# Patient Record
Sex: FEMALE | Race: WHITE | NOT HISPANIC OR LATINO
[De-identification: names, ages, dates, MRNs, and addresses within clinical notes are randomized per-mention and may not be internally consistent; named-entity substitution may affect disease eponyms.]

---

## 2019-06-11 ENCOUNTER — APPOINTMENT (OUTPATIENT)
Dept: ENDOCRINOLOGY | Facility: CLINIC | Age: 59
End: 2019-06-11

## 2020-01-17 ENCOUNTER — APPOINTMENT (OUTPATIENT)
Dept: NEUROLOGY | Facility: CLINIC | Age: 60
End: 2020-01-17
Payer: COMMERCIAL

## 2020-01-17 VITALS
HEIGHT: 62 IN | DIASTOLIC BLOOD PRESSURE: 77 MMHG | WEIGHT: 135 LBS | SYSTOLIC BLOOD PRESSURE: 133 MMHG | HEART RATE: 72 BPM | BODY MASS INDEX: 24.84 KG/M2
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
## 2020-01-17 DIAGNOSIS — G56.03 CARPAL TUNNEL SYNDROM,BILATERAL UPPER LIMBS: ICD-10-CM

## 2020-01-17 DIAGNOSIS — H02.401 UNSPECIFIED PTOSIS OF RIGHT EYELID: ICD-10-CM

## 2020-01-17 DIAGNOSIS — Z87.891 PERSONAL HISTORY OF NICOTINE DEPENDENCE: ICD-10-CM

## 2020-01-17 DIAGNOSIS — M54.2 CERVICALGIA: ICD-10-CM

## 2020-01-17 DIAGNOSIS — R19.8 OTHER SPECIFIED SYMPTOMS AND SIGNS INVOLVING THE DIGESTIVE SYSTEM AND ABDOMEN: ICD-10-CM

## 2020-01-17 DIAGNOSIS — G89.29 LOW BACK PAIN: ICD-10-CM

## 2020-01-17 DIAGNOSIS — R41.3 OTHER AMNESIA: ICD-10-CM

## 2020-01-17 DIAGNOSIS — Z78.9 OTHER SPECIFIED HEALTH STATUS: ICD-10-CM

## 2020-01-17 DIAGNOSIS — M54.5 LOW BACK PAIN: ICD-10-CM

## 2020-01-17 PROCEDURE — 99244 OFF/OP CNSLTJ NEW/EST MOD 40: CPT

## 2020-01-17 RX ORDER — PANTOPRAZOLE 40 MG/1
TABLET, DELAYED RELEASE ORAL
Refills: 0 | Status: ACTIVE | COMMUNITY

## 2020-01-17 RX ORDER — MEPERIDINE HYDROCHLORIDE 100 MG/1
100 TABLET ORAL
Refills: 0 | Status: ACTIVE | COMMUNITY

## 2020-01-17 RX ORDER — ACARBOSE 100 MG/1
100 TABLET ORAL
Refills: 0 | Status: ACTIVE | COMMUNITY

## 2020-01-17 RX ORDER — LINACLOTIDE 290 UG/1
290 CAPSULE, GELATIN COATED ORAL
Refills: 0 | Status: ACTIVE | COMMUNITY
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
## 2020-01-17 NOTE — REVIEW OF SYSTEMS
[Feeling Poorly] : feeling poorly [Memory Lapses or Loss] : memory loss [Facial Weakness] : facial weakness [Numbness] : numbness [Tingling] : tingling [Dizziness] : dizziness [Migraine Headache] : migraine headaches [As Noted in HPI] : as noted in HPI [Negative] : Heme/Lymph

## 2022-04-14 ENCOUNTER — APPOINTMENT (OUTPATIENT)
Dept: ORTHOPEDIC SURGERY | Facility: CLINIC | Age: 62
End: 2022-04-14
Payer: COMMERCIAL

## 2022-04-14 VITALS — BODY MASS INDEX: 24.84 KG/M2 | HEIGHT: 62 IN | WEIGHT: 135 LBS

## 2022-04-14 PROCEDURE — 99214 OFFICE O/P EST MOD 30 MIN: CPT | Mod: 25

## 2022-04-14 PROCEDURE — 20610 DRAIN/INJ JOINT/BURSA W/O US: CPT | Mod: RT

## 2022-04-14 NOTE — DATA REVIEWED
[FreeTextEntry1] : Right knee MRI - \par 1. Complex medial meniscal tearing and medial compartment arthrosis in addition to patellofemoral\par compartment arthrosis with effusion, synovitis, small popliteal cyst and mild posterior soft tissue swelling.\par 2. Slight chronic MCL sprain.\par 3. No acute fracture or lateral meniscal tear.

## 2022-04-14 NOTE — IMAGING
[de-identified] : Right Knee: Inspection of the knee is as follows: minimal effusion. Palpation of the knee is as follows: medial\par joint line tenderness, medial facet of patella tenderness, lateral facet of patella tenderness, patellar\par compression tenderness and crepitus about the patella. distal thigh anterior ttp Knee Range of Motion is as\par follows: Extension: 0 degrees. Flexion: 120 degrees. Strength examination of the knee is as follows: Quadriceps\par strength is 5/5 Neurological examination of the knee is as follows: light touch is intact throughout. Gait and function is\par as follows: mildly antalgic gait.

## 2022-04-14 NOTE — PROCEDURE
[FreeTextEntry3] : The risks, benefits and alternatives to the injection were discussed with the patient. The decision was made to proceed with the injection to reduce inflammation within the area. Verbal consent was obtained for the procedure. The right knee was cleaned with alcohol and ethyl chloride was sprayed topically. Zilretta was injected at the proper mixture 32 Units in 5cc suspension, Lidocaine: 2cc. The patient tolerated the procedure well and was instructed to ice the affected joint as needed later today. Activities can be performed as tolerated\par \par

## 2022-04-14 NOTE — ASSESSMENT
[FreeTextEntry1] : 04/14/2022 - 61 year F with right knee medial meniscal tearing. Would like zilretta injection today. Had prior gel injections w/o significant relief.

## 2022-04-14 NOTE — HISTORY OF PRESENT ILLNESS
[10] : 10 [Dull/Aching] : dull/aching [Constant] : constant [Work] : work [Injection therapy] : injection therapy [Walking] : walking [de-identified] : Ms. TATA LONGORIA is a 61 year female that comes in today with a chief complaint of right knee pain. Pt of Dr. Ordoñez, last received zilretta injection last year. C/o right knee pain today with ambulation.\par  [] : Post Surgical Visit: no [FreeTextEntry1] : right knee  [FreeTextEntry5] : pt was seeing  Dr. Ordoñez for a series of injections and is here today to start the series again  [de-identified] : Dr. Ordoñez  [de-identified] : xrays/mri [de-identified] : november

## 2022-09-08 ENCOUNTER — APPOINTMENT (OUTPATIENT)
Dept: ORTHOPEDIC SURGERY | Facility: CLINIC | Age: 62
End: 2022-09-08
Payer: COMMERCIAL

## 2022-09-08 DIAGNOSIS — M17.11 UNILATERAL PRIMARY OSTEOARTHRITIS, RIGHT KNEE: ICD-10-CM

## 2022-09-08 PROCEDURE — 99214 OFFICE O/P EST MOD 30 MIN: CPT | Mod: 25

## 2022-09-08 PROCEDURE — 20610 DRAIN/INJ JOINT/BURSA W/O US: CPT | Mod: RT

## 2022-09-08 PROCEDURE — 99213 OFFICE O/P EST LOW 20 MIN: CPT | Mod: 25

## 2022-09-08 NOTE — HISTORY OF PRESENT ILLNESS
[10] : 10 [Constant] : constant [Household chores] : household chores [Leisure] : leisure [Sleep] : sleep [de-identified] : 62 f right knee with known OA. has been treated with zilretta last in april 2022 with good results. notes previously treated with zilretta with good results prior as well. recently diagnosed with PMR by rheumatology\par  [FreeTextEntry1] : Right Knee

## 2022-09-08 NOTE — IMAGING
[de-identified] : \par ------------------------------------------------------------------------------------------------------------------------------------------------------\par \par Bilateral knee exam: \par \par Inspection: \par    min Effusion\par    + valgus Malalignment\par    (-) Swelling\par    (-) Quad atrophy\par    (-) J-sign\par ROM: \par    0 - 125 degrees of flexion.\par Tenderness: \par    +MJLT\par    +LJLT\par    +Medial patellar facet tenderness\par    +Lateral patellar facet tenderness\par    (-) Crepitus\par    (-) Patellar grind tenderness\par    (-) Patellar tendon\par    (-) Quad tendon\par    Other: \par Stability: \par    (-) Lachman\par    (-) Varus/Valgus instability\par    (-) Posterior drawer\par    (-) Patellar translation: wnl\par Additional tests: \par    (-) McMurrays test\par    (-) Patellar apprehension\par    Other: \par Strength: 5/5 Q/H/TA/GS/EHL\par Neuro: In tact to light touch throughout, DTR's wnl\par Vascularity: Extremity warm and well perfused\par Gait: normal\par \par \par \par  [Right] : right knee [All Views] : anteroposterior, lateral, skyline, and anteroposterior standing [Mild tricompartmental OA medial narrowing] : Mild tricompartmental OA medial narrowing

## 2022-11-10 ENCOUNTER — FORM ENCOUNTER (OUTPATIENT)
Age: 62
End: 2022-11-10

## 2022-11-10 ENCOUNTER — APPOINTMENT (OUTPATIENT)
Dept: ORTHOPEDIC SURGERY | Facility: CLINIC | Age: 62
End: 2022-11-10
Payer: COMMERCIAL

## 2022-11-10 DIAGNOSIS — M89.8X2 OTHER SPECIFIED DISORDERS OF BONE, UPPER ARM: ICD-10-CM

## 2022-11-10 PROCEDURE — 73060 X-RAY EXAM OF HUMERUS: CPT | Mod: LT

## 2022-11-10 PROCEDURE — 73010 X-RAY EXAM OF SHOULDER BLADE: CPT | Mod: LT

## 2022-11-10 PROCEDURE — 99213 OFFICE O/P EST LOW 20 MIN: CPT

## 2022-11-10 PROCEDURE — 99214 OFFICE O/P EST MOD 30 MIN: CPT

## 2022-11-10 PROCEDURE — 73030 X-RAY EXAM OF SHOULDER: CPT | Mod: LT

## 2022-11-10 NOTE — HISTORY OF PRESENT ILLNESS
[de-identified] : 62 f with left shouder pain. notes that she was seeing a rheumatologist for PMR and having cortisone injections which were helping. howver she ultimately developed a reaction to the injections and after they stopped she has signifiant left lateral shoulder pain.  pain present for months. +NT into hands\par

## 2022-11-10 NOTE — DISCUSSION/SUMMARY
[de-identified] : arthritis does not explain extent of her pain. her pain is out of proportion and recommend mri left shoulder to eval rot cuff tearing, possible AVN, and mri left humerus to eval cortical irregularity. \par fu p mri\par \par ------------------------------------------------------------------------------------------------------------------------------------------------------\par \par The patient was advised of the diagnosis.  The natural history of the pathology was explained in full. All questions were answered.  The risks and benefits of conservative and interventional treatment alternatives were explained to the patient\par \par ------------------------------------------------------------------------------------------------------------------------------------------------------\par \par The patient was advised that an advanced diagnostic/imaging study (MRI/CT/etc) will be ordered to evaluate potential pathology in the affected area(s).  The patient was further advised to follow up in the office to review the results of the study and determine further management that may be indicated.\par \par

## 2022-11-10 NOTE — IMAGING
[de-identified] : \par ------------------------------------------------------------------------------------------------------------------------------------------------------\par \par Left shoulder exam: \par \par limited exam due to pain and guarding\par \par Inspection: no obvious deformity, no obvious masses, no swelling, no effusion, no atrophy\par ROM: \par    FF: 40 active and passive 120 guarding\par    ER: 10\par Tenderness:\par    (-) Anterior/Biceps: \par    (-) Posterior\par    +Lateral\par    (-) Trapezius\par    (-) Scapula\par    (-) AC joint\par    (-) Crepitus with ROM\par Strength: \par    FF: limited exam\par    ER: 4+/5\par    IR: 4+/5\par    Biceps: 5/5\par    Triceps: 5/5\par    Distal: 5/5\par Neuro: In tact to light touch throughout\par Vascularity: Extremity warm and well perfused\par \par  [Left] : left shoulder [Glenohumeral arthritis] : Glenohumeral arthritis [There are no fractures, subluxations or dislocations. No significant abnormalities are seen] : There are no fractures, subluxations or dislocations. No significant abnormalities are seen [Type 2 acromion] : Type 2 acromion [FreeTextEntry1] : mild inferior humeral osteophyte. GT remodeling [FreeTextEntry4] : cortical irregularity at medial humeral shaft.

## 2022-11-11 ENCOUNTER — APPOINTMENT (OUTPATIENT)
Dept: MRI IMAGING | Facility: CLINIC | Age: 62
End: 2022-11-11

## 2022-11-11 PROCEDURE — 73221 MRI JOINT UPR EXTREM W/O DYE: CPT | Mod: LT

## 2022-11-11 PROCEDURE — 73218 MRI UPPER EXTREMITY W/O DYE: CPT | Mod: LT

## 2022-11-14 ENCOUNTER — APPOINTMENT (OUTPATIENT)
Dept: ORTHOPEDIC SURGERY | Facility: CLINIC | Age: 62
End: 2022-11-14

## 2022-11-14 VITALS — HEIGHT: 62 IN | BODY MASS INDEX: 24.84 KG/M2 | WEIGHT: 135 LBS

## 2022-11-14 DIAGNOSIS — M75.22 BICIPITAL TENDINITIS, LEFT SHOULDER: ICD-10-CM

## 2022-11-14 DIAGNOSIS — M25.512 PAIN IN LEFT SHOULDER: ICD-10-CM

## 2022-11-14 DIAGNOSIS — M19.012 PRIMARY OSTEOARTHRITIS, LEFT SHOULDER: ICD-10-CM

## 2022-11-14 PROCEDURE — 99215 OFFICE O/P EST HI 40 MIN: CPT

## 2022-11-14 NOTE — DATA REVIEWED
[Shoulder] : shoulder [MRI] : MRI [Left] : left [I independently reviewed and interpreted images and report] : I independently reviewed and interpreted images and report [FreeTextEntry1] : mri - left shoulder - full thickness rotator cuff tearing of supra. glenohumeral oa, labral tearing, biceps tendontiis, ac arthrosis.  [FreeTextEntry2] : humerus - deltoid edema.

## 2022-11-14 NOTE — IMAGING
[Left] : left shoulder [Glenohumeral arthritis] : Glenohumeral arthritis [There are no fractures, subluxations or dislocations. No significant abnormalities are seen] : There are no fractures, subluxations or dislocations. No significant abnormalities are seen [Type 2 acromion] : Type 2 acromion [de-identified] : \par ------------------------------------------------------------------------------------------------------------------------------------------------------\par \par Left shoulder exam: \par \par limited exam due to pain and guarding\par \par Inspection: no obvious deformity, no obvious masses, no swelling, no effusion, no atrophy\par ROM: \par    FF: 40 active and passive 120 guarding\par    ER: 10\par Tenderness:\par    (-) Anterior/Biceps: \par    (-) Posterior\par    +Lateral\par    (-) Trapezius\par    (-) Scapula\par    (-) AC joint\par    (-) Crepitus with ROM\par Strength: \par    FF: limited exam\par    ER: 4+/5\par    IR: 4+/5\par    Biceps: 5/5\par    Triceps: 5/5\par    Distal: 5/5\par Neuro: In tact to light touch throughout\par Vascularity: Extremity warm and well perfused\par \par  [FreeTextEntry1] : mild inferior humeral osteophyte. GT remodeling [FreeTextEntry4] : cortical irregularity at medial humeral shaft.

## 2022-11-14 NOTE — DISCUSSION/SUMMARY
[de-identified] : arthritis does not explain extent of her pain. her pain is out of proportion.  mri reviewed. discussed rot cuff tear. recommend surgical rot cuff repair. advised cannot guarantee sympomatic relief given pain severity, however this may explain her pain. advised surgical arthroscopy, rcr, sad, ghd, biceps tenotomy, dce. discussed tenotomy vs tenodesis and risks of juan and patient amenabe to tenotomy. advised presence of oa and inferior results of arthroscopy in setting of oa.  advised given signiifacnt number of allergies including with pain medications and previous surgical complications, i would recommned hospital setting for surgery, however this would place surgery in january. patient wishes to seek another opinion if someone can operate sooner. \par \par discussed pain management, advised patient surgery can be very painful and she may want to consult pain management. she indicates she has demerol at home and would use that in case she has pain. advised i would not write her for any advanced pain medications, other than percocet and would rather refer her to pain management if she needs. she understands\par \par ------------------------------------------------------------------------------------------------------------------------------------------------------\par \par The patient was advised of the diagnosis.  The natural history of the pathology was explained in full. All questions were answered.  The risks and benefits of conservative and interventional treatment alternatives were explained to the patient\par \par ------------------------------------------------------------------------------------------------------------------------------------------------------\par \par MRI(s) and/or other advanced imaging studies (CT/etc) were reviewed with the patient. Implications of the study(ies) together with the patient's clinical picture were discussed to formulate a working diagnosis and management options were detailed.\par \par ------------------------------------------------------------------------------------------------------------------------------------------------------\par \par The patient was advised of the diagnosis.  The natural history of the pathology was explained in full to the patient. All questions were answered.  The risks and benefits of surgical and non-surgical treatment were explained in full to the patient.  The patient demonstrated a full understanding of the surgical and non-surgical options.  The risks of surgery were outlined in full to the patient including but not limited to pain, stiffness, bleeding, scarring, infection, neurologic injury, vascular injury, failure to resolve symptoms, symptom recurrence, the need for further surgery, non-healing, implant failure, intraoperative fracture, wound breakdown, deep vein thrombosis, pulmonary embolism, anesthesia complications and even death.  The patient understood all the risks and accepted them and understood that other complications could occur that are not mentioned above.  The intraoperative plan, post-operative plan, post-operative expectations and limitations were explained in full.  Importance of postoperative rehabilitation and restriction compliance was explained and emphasized. Expectations from non-surgical treatment were explained in full as well.  The patient demonstrated a complete understanding of the treatment detailed, the risks and alternatives.\par \par

## 2022-11-14 NOTE — HISTORY OF PRESENT ILLNESS
[Left Arm] : left arm [Sudden] : sudden [Sharp] : sharp [Constant] : constant [de-identified] : 62 f with left shouder pain. notes that she was seeing a rheumatologist for PMR and having cortisone injections which were helping. howver she ultimately developed a reaction to the injections and after they stopped she has signifiant left lateral shoulder pain.  pain present for months. +NT into hands\par  [] : no [de-identified] : lifting arm

## 2025-07-14 ENCOUNTER — APPOINTMENT (OUTPATIENT)
Dept: ORTHOPEDIC SURGERY | Facility: CLINIC | Age: 65
End: 2025-07-14
Payer: MEDICARE

## 2025-07-14 VITALS — WEIGHT: 135 LBS | HEIGHT: 62 IN | BODY MASS INDEX: 24.84 KG/M2

## 2025-07-14 PROBLEM — M25.511 SHOULDER PAIN, RIGHT: Status: ACTIVE | Noted: 2025-07-14

## 2025-07-14 PROBLEM — I74.2: Status: RESOLVED | Noted: 2025-07-14 | Resolved: 2025-07-14

## 2025-07-14 PROBLEM — S46.211A RUPTURE OF RIGHT BICEPS TENDON, INITIAL ENCOUNTER: Status: ACTIVE | Noted: 2025-07-14

## 2025-07-14 PROBLEM — Z87.19 HISTORY OF PANCREATITIS: Status: RESOLVED | Noted: 2025-07-14 | Resolved: 2025-07-14

## 2025-07-14 PROBLEM — Z53.31 LAPAROSCOPIC SURGICAL PROCEDURE CONVERTED TO OPEN PROCEDURE: Status: RESOLVED | Noted: 2025-07-14 | Resolved: 2025-07-14

## 2025-07-14 PROBLEM — K83.8 CHOLANGIECTASIS: Status: RESOLVED | Noted: 2025-07-14 | Resolved: 2025-07-14

## 2025-07-14 PROBLEM — Z90.49 HISTORY OF APPENDECTOMY: Status: RESOLVED | Noted: 2025-07-14 | Resolved: 2025-07-14

## 2025-07-14 PROBLEM — Z98.890 HISTORY OF REPAIR OF ROTATOR CUFF: Status: RESOLVED | Noted: 2025-07-14 | Resolved: 2025-07-14

## 2025-07-14 PROBLEM — K56.1 INTUSSUSCEPTION, ACQUIRED: Status: RESOLVED | Noted: 2025-07-14 | Resolved: 2025-07-14

## 2025-07-14 PROCEDURE — 99214 OFFICE O/P EST MOD 30 MIN: CPT

## 2025-07-14 PROCEDURE — 73010 X-RAY EXAM OF SHOULDER BLADE: CPT | Mod: RT

## 2025-07-14 PROCEDURE — 73030 X-RAY EXAM OF SHOULDER: CPT | Mod: RT

## 2025-07-14 RX ORDER — LEVOTHYROXINE SODIUM 0.17 MG/1
TABLET ORAL
Refills: 0 | Status: ACTIVE | COMMUNITY

## 2025-07-14 RX ORDER — CHLORDIAZEPOXIDE/METHYLSCOPOLA 5 MG-2.5MG
CAPSULE ORAL
Refills: 0 | Status: ACTIVE | COMMUNITY

## 2025-07-14 RX ORDER — CYCLOBENZAPRINE HCL 5 MG
TABLET ORAL
Refills: 0 | Status: ACTIVE | COMMUNITY

## 2025-07-14 RX ORDER — ACITRETIN/EMOLLIENT COMB NO.26 10 MG
KIT MISCELLANEOUS
Refills: 0 | Status: ACTIVE | COMMUNITY

## 2025-07-15 ENCOUNTER — APPOINTMENT (OUTPATIENT)
Dept: MRI IMAGING | Facility: CLINIC | Age: 65
End: 2025-07-15
Payer: MEDICARE

## 2025-07-15 PROCEDURE — 73221 MRI JOINT UPR EXTREM W/O DYE: CPT | Mod: RT

## 2025-07-17 ENCOUNTER — TRANSCRIPTION ENCOUNTER (OUTPATIENT)
Age: 65
End: 2025-07-17

## 2025-07-18 ENCOUNTER — APPOINTMENT (OUTPATIENT)
Dept: ORTHOPEDIC SURGERY | Facility: CLINIC | Age: 65
End: 2025-07-18
Payer: MEDICARE

## 2025-07-18 VITALS — HEIGHT: 62 IN | WEIGHT: 135 LBS | BODY MASS INDEX: 24.84 KG/M2

## 2025-07-18 PROBLEM — M75.111 INCOMPLETE TEAR OF RIGHT ROTATOR CUFF, UNSPECIFIED WHETHER TRAUMATIC: Status: ACTIVE | Noted: 2025-07-18

## 2025-07-18 PROBLEM — M75.21 BICEPS TENDINITIS OF RIGHT UPPER EXTREMITY: Status: ACTIVE | Noted: 2025-07-18

## 2025-07-18 PROCEDURE — 20611 DRAIN/INJ JOINT/BURSA W/US: CPT | Mod: RT

## 2025-07-18 PROCEDURE — 99213 OFFICE O/P EST LOW 20 MIN: CPT | Mod: 25

## 2025-09-05 ENCOUNTER — APPOINTMENT (OUTPATIENT)
Dept: ORTHOPEDIC SURGERY | Facility: CLINIC | Age: 65
End: 2025-09-05
Payer: MEDICARE

## 2025-09-05 DIAGNOSIS — M75.111 INCOMPLETE ROTATOR CUFF TEAR OR RUPTURE OF RIGHT SHOULDER, NOT SPECIFIED AS TRAUMATIC: ICD-10-CM

## 2025-09-05 DIAGNOSIS — M75.21 BICIPITAL TENDINITIS, RIGHT SHOULDER: ICD-10-CM

## 2025-09-05 PROCEDURE — 99213 OFFICE O/P EST LOW 20 MIN: CPT
